# Patient Record
Sex: FEMALE | Race: WHITE | NOT HISPANIC OR LATINO | Employment: FULL TIME | URBAN - METROPOLITAN AREA
[De-identification: names, ages, dates, MRNs, and addresses within clinical notes are randomized per-mention and may not be internally consistent; named-entity substitution may affect disease eponyms.]

---

## 2018-06-14 ENCOUNTER — OFFICE VISIT (OUTPATIENT)
Dept: URGENT CARE | Facility: CLINIC | Age: 33
End: 2018-06-14
Payer: COMMERCIAL

## 2018-06-14 VITALS
OXYGEN SATURATION: 99 % | SYSTOLIC BLOOD PRESSURE: 116 MMHG | HEART RATE: 84 BPM | DIASTOLIC BLOOD PRESSURE: 78 MMHG | HEIGHT: 71 IN | BODY MASS INDEX: 21 KG/M2 | TEMPERATURE: 97.8 F | WEIGHT: 150 LBS | RESPIRATION RATE: 18 BRPM

## 2018-06-14 DIAGNOSIS — B96.89 BACTERIAL VAGINITIS: Primary | ICD-10-CM

## 2018-06-14 DIAGNOSIS — N76.0 BACTERIAL VAGINITIS: Primary | ICD-10-CM

## 2018-06-14 PROCEDURE — 99283 EMERGENCY DEPT VISIT LOW MDM: CPT | Performed by: PHYSICIAN ASSISTANT

## 2018-06-14 PROCEDURE — G0382 LEV 3 HOSP TYPE B ED VISIT: HCPCS | Performed by: PHYSICIAN ASSISTANT

## 2018-06-14 RX ORDER — METRONIDAZOLE 500 MG/1
500 TABLET ORAL EVERY 12 HOURS SCHEDULED
Qty: 14 TABLET | Refills: 0 | Status: SHIPPED | OUTPATIENT
Start: 2018-06-14 | End: 2018-06-21

## 2018-06-15 NOTE — PROGRESS NOTES
[facility] [facility]        NAME: Sergei Andres is a 35 y o  female  : 1985    MRN: [id number]  DATE: Jennifer 15, 2018  TIME: 1:49 PM    Assessment and Plan   Bacterial vaginitis [N76 0, B96 89]  1  Bacterial vaginitis  metroNIDAZOLE (FLAGYL) 500 mg tablet     Patient Instructions     Take antibiotic as prescribed  Follow up with PCP in 3-5 days  Proceed to  ER if symptoms worsen  Chief Complaint     Chief Complaint   Patient presents with    Vaginitis     Itching and burning  White thin discharge with fishy odor  Onset yesterday morning  History of Present Illness       32yo F p/w vaginal itching and burning x 2 days  Patient endorses thin, white discharge that has a strong fishy odor  Patient denies new sexual partners and states she has been recently tested for STIs  Review of Systems   Review of Systems   Constitutional: Negative for activity change, appetite change, chills, diaphoresis, fatigue and fever  Respiratory: Negative for apnea, cough, choking, chest tightness, shortness of breath, wheezing and stridor  Cardiovascular: Negative for chest pain, palpitations and leg swelling  Genitourinary: Positive for vaginal discharge and vaginal pain  Negative for decreased urine volume, difficulty urinating, dyspareunia, dysuria, enuresis, flank pain, frequency, genital sores, hematuria, menstrual problem, pelvic pain, urgency and vaginal bleeding  Current Medications       Current Outpatient Prescriptions:     metroNIDAZOLE (FLAGYL) 500 mg tablet, Take 1 tablet (500 mg total) by mouth every 12 (twelve) hours for 7 days, Disp: 14 tablet, Rfl: 0    Current Allergies     Allergies as of 2018    (No Known Allergies)            The following portions of the patient's history were reviewed and updated as appropriate: allergies, current medications, past family history, past medical history, past social history, past surgical history and problem list      History reviewed   No pertinent past medical history  Past Surgical History:   Procedure Laterality Date    AUGMENTATION BREAST         No family history on file  Medications have been verified  Objective   /78   Pulse 84   Temp 97 8 °F (36 6 °C)   Resp 18   Ht 5' 11" (1 803 m)   Wt 68 kg (150 lb)   LMP 05/01/2018   SpO2 99%   BMI 20 92 kg/m²        Physical Exam     Physical Exam   Constitutional: She appears well-developed and well-nourished  Cardiovascular: Normal rate, regular rhythm, normal heart sounds and intact distal pulses  Exam reveals no gallop and no friction rub  No murmur heard  Pulmonary/Chest: Effort normal  No respiratory distress  She has no wheezes  She has no rales  Abdominal: Soft  Bowel sounds are normal  She exhibits no distension  There is no tenderness  There is no rebound and no guarding

## 2022-04-12 ENCOUNTER — APPOINTMENT (EMERGENCY)
Dept: CT IMAGING | Facility: HOSPITAL | Age: 37
End: 2022-04-12
Payer: COMMERCIAL

## 2022-04-12 ENCOUNTER — HOSPITAL ENCOUNTER (EMERGENCY)
Facility: HOSPITAL | Age: 37
Discharge: HOME | End: 2022-04-12
Attending: EMERGENCY MEDICINE | Admitting: EMERGENCY MEDICINE
Payer: COMMERCIAL

## 2022-04-12 VITALS
HEART RATE: 62 BPM | WEIGHT: 153 LBS | HEIGHT: 71 IN | BODY MASS INDEX: 21.42 KG/M2 | SYSTOLIC BLOOD PRESSURE: 132 MMHG | RESPIRATION RATE: 16 BRPM | DIASTOLIC BLOOD PRESSURE: 80 MMHG | OXYGEN SATURATION: 98 % | TEMPERATURE: 98 F

## 2022-04-12 DIAGNOSIS — M54.50 LOW BACK PAIN: Primary | ICD-10-CM

## 2022-04-12 LAB
ANION GAP SERPL CALCULATED.3IONS-SCNC: 10 MMOL/L (ref 4–13)
BASOPHILS # BLD AUTO: 0.06 THOUSANDS/ΜL (ref 0–0.1)
BASOPHILS NFR BLD AUTO: 1 % (ref 0–1)
BILIRUB UR QL STRIP: NEGATIVE
BUN SERPL-MCNC: 10 MG/DL (ref 5–25)
CALCIUM SERPL-MCNC: 8.5 MG/DL (ref 8.3–10.1)
CHLORIDE SERPL-SCNC: 104 MMOL/L (ref 100–108)
CLARITY UR: CLEAR
CO2 SERPL-SCNC: 24 MMOL/L (ref 21–32)
COLOR UR: NORMAL
CREAT SERPL-MCNC: 1 MG/DL (ref 0.6–1.3)
EOSINOPHIL # BLD AUTO: 0.05 THOUSAND/ΜL (ref 0–0.61)
EOSINOPHIL NFR BLD AUTO: 1 % (ref 0–6)
ERYTHROCYTE [DISTWIDTH] IN BLOOD BY AUTOMATED COUNT: 12.3 % (ref 11.6–15.1)
EXT PREG TEST URINE: NEGATIVE
EXT. CONTROL ED NAV: NEGATIVE
GFR SERPL CREATININE-BSD FRML MDRD: 72 ML/MIN/1.73SQ M
GLUCOSE SERPL-MCNC: 89 MG/DL (ref 65–140)
GLUCOSE UR STRIP-MCNC: NEGATIVE MG/DL
HCT VFR BLD AUTO: 43.2 % (ref 34.8–46.1)
HGB BLD-MCNC: 14.6 G/DL (ref 11.5–15.4)
HGB UR QL STRIP.AUTO: NEGATIVE
IMM GRANULOCYTES # BLD AUTO: 0.01 THOUSAND/UL (ref 0–0.2)
IMM GRANULOCYTES NFR BLD AUTO: 0 % (ref 0–2)
KETONES UR STRIP-MCNC: NEGATIVE MG/DL
LEUKOCYTE ESTERASE UR QL STRIP: NEGATIVE
LYMPHOCYTES # BLD AUTO: 1.24 THOUSANDS/ΜL (ref 0.6–4.47)
LYMPHOCYTES NFR BLD AUTO: 23 % (ref 14–44)
MCH RBC QN AUTO: 32.3 PG (ref 26.8–34.3)
MCHC RBC AUTO-ENTMCNC: 33.8 G/DL (ref 31.4–37.4)
MCV RBC AUTO: 96 FL (ref 82–98)
MONOCYTES # BLD AUTO: 0.44 THOUSAND/ΜL (ref 0.17–1.22)
MONOCYTES NFR BLD AUTO: 8 % (ref 4–12)
NEUTROPHILS # BLD AUTO: 3.49 THOUSANDS/ΜL (ref 1.85–7.62)
NEUTS SEG NFR BLD AUTO: 67 % (ref 43–75)
NITRITE UR QL STRIP: NEGATIVE
NRBC BLD AUTO-RTO: 0 /100 WBCS
PH UR STRIP.AUTO: 8 [PH]
PLATELET # BLD AUTO: 189 THOUSANDS/UL (ref 149–390)
PMV BLD AUTO: 11.4 FL (ref 8.9–12.7)
POTASSIUM SERPL-SCNC: 3.5 MMOL/L (ref 3.5–5.3)
PROT UR STRIP-MCNC: NEGATIVE MG/DL
RBC # BLD AUTO: 4.52 MILLION/UL (ref 3.81–5.12)
SODIUM SERPL-SCNC: 138 MMOL/L (ref 136–145)
SP GR UR STRIP.AUTO: 1.01 (ref 1–1.03)
UROBILINOGEN UR QL STRIP.AUTO: 0.2 E.U./DL
WBC # BLD AUTO: 5.29 THOUSAND/UL (ref 4.31–10.16)

## 2022-04-12 PROCEDURE — 81025 URINE PREGNANCY TEST: CPT | Performed by: EMERGENCY MEDICINE

## 2022-04-12 PROCEDURE — 99285 EMERGENCY DEPT VISIT HI MDM: CPT | Performed by: EMERGENCY MEDICINE

## 2022-04-12 PROCEDURE — 96375 TX/PRO/DX INJ NEW DRUG ADDON: CPT

## 2022-04-12 PROCEDURE — 96376 TX/PRO/DX INJ SAME DRUG ADON: CPT

## 2022-04-12 PROCEDURE — 99284 EMERGENCY DEPT VISIT MOD MDM: CPT

## 2022-04-12 PROCEDURE — 96374 THER/PROPH/DIAG INJ IV PUSH: CPT

## 2022-04-12 PROCEDURE — 74177 CT ABD & PELVIS W/CONTRAST: CPT

## 2022-04-12 PROCEDURE — 85025 COMPLETE CBC W/AUTO DIFF WBC: CPT | Performed by: EMERGENCY MEDICINE

## 2022-04-12 PROCEDURE — 81003 URINALYSIS AUTO W/O SCOPE: CPT | Performed by: EMERGENCY MEDICINE

## 2022-04-12 PROCEDURE — 36415 COLL VENOUS BLD VENIPUNCTURE: CPT | Performed by: EMERGENCY MEDICINE

## 2022-04-12 PROCEDURE — 80048 BASIC METABOLIC PNL TOTAL CA: CPT | Performed by: EMERGENCY MEDICINE

## 2022-04-12 RX ORDER — MORPHINE SULFATE 4 MG/ML
4 INJECTION, SOLUTION INTRAMUSCULAR; INTRAVENOUS ONCE
Status: COMPLETED | OUTPATIENT
Start: 2022-04-12 | End: 2022-04-12

## 2022-04-12 RX ORDER — ONDANSETRON 2 MG/ML
4 INJECTION INTRAMUSCULAR; INTRAVENOUS ONCE
Status: COMPLETED | OUTPATIENT
Start: 2022-04-12 | End: 2022-04-12

## 2022-04-12 RX ORDER — DIAZEPAM 5 MG/1
5-10 TABLET ORAL EVERY 6 HOURS PRN
Qty: 8 TABLET | Refills: 0 | Status: SHIPPED | OUTPATIENT
Start: 2022-04-12 | End: 2022-04-22

## 2022-04-12 RX ORDER — PREDNISONE 10 MG/1
50 TABLET ORAL DAILY
Qty: 25 TABLET | Refills: 0 | Status: SHIPPED | OUTPATIENT
Start: 2022-04-12 | End: 2022-04-17

## 2022-04-12 RX ORDER — KETOROLAC TROMETHAMINE 30 MG/ML
15 INJECTION, SOLUTION INTRAMUSCULAR; INTRAVENOUS ONCE
Status: COMPLETED | OUTPATIENT
Start: 2022-04-12 | End: 2022-04-12

## 2022-04-12 RX ADMIN — MORPHINE SULFATE 4 MG: 4 INJECTION INTRAVENOUS at 12:41

## 2022-04-12 RX ADMIN — MORPHINE SULFATE 4 MG: 4 INJECTION INTRAVENOUS at 13:11

## 2022-04-12 RX ADMIN — KETOROLAC TROMETHAMINE 15 MG: 30 INJECTION, SOLUTION INTRAMUSCULAR at 12:41

## 2022-04-12 RX ADMIN — IOHEXOL 100 ML: 350 INJECTION, SOLUTION INTRAVENOUS at 13:25

## 2022-04-12 RX ADMIN — ONDANSETRON 4 MG: 2 INJECTION INTRAMUSCULAR; INTRAVENOUS at 12:41

## 2022-04-12 NOTE — ED NOTES
Patient discharged to home  Verbalized understanding of discharge instructions and need for follow up care  IV dc'd intact         Paulie Ramos RN  04/12/22 3412

## 2022-04-12 NOTE — ED PROVIDER NOTES
History  Chief Complaint   Patient presents with    Flank Pain     pt c/o left sided flank pain radiating to abdomen w/nausea for past couple days  pt seen at urgent care and instructed to come to ED per further evaluation  51-year-old female complains of stabbing left flank pain radiating to left lower quadrant abdomen, severely since 3:00 a m  Today  But ongoing for the past few days, dissimilar compared to chronic low back pain  Also dissimilar to menstrual cramping pain  Notes she has an IUD in recently at gyn, no cysts  She denies vomiting, notes she is nauseated  She denies constipation and diarrhea , stooling normally  No injury  No fever or chills  No history of nephrolithiasis      History provided by:  Patient  Flank Pain  Pain location:  L flank  Pain quality: stabbing    Pain radiates to:  LLQ  Pain severity:  Severe  Onset quality:  Sudden  Timing:  Constant  Progression:  Worsening  Chronicity:  New  Context: not trauma    Relieved by:  None tried  Worsened by:  Nothing  Ineffective treatments:  None tried  Associated symptoms: no chest pain, no fever and no shortness of breath        None       History reviewed  No pertinent past medical history  Past Surgical History:   Procedure Laterality Date    AUGMENTATION BREAST         History reviewed  No pertinent family history  I have reviewed and agree with the history as documented  E-Cigarette/Vaping    E-Cigarette Use Never User      E-Cigarette/Vaping Substances     Social History     Tobacco Use    Smoking status: Current Every Day Smoker     Packs/day: 1 00     Types: Cigarettes    Smokeless tobacco: Never Used   Vaping Use    Vaping Use: Never used   Substance Use Topics    Alcohol use: Yes     Alcohol/week: 8 0 standard drinks     Types: 2 Glasses of wine, 6 Cans of beer per week     Comment: daily    Drug use: Yes     Types: Marijuana       Review of Systems   Constitutional: Negative for fever     Respiratory: Negative for shortness of breath  Cardiovascular: Negative for chest pain  Genitourinary: Positive for flank pain  Physical Exam  Physical Exam  Vitals and nursing note reviewed  Constitutional:       General: She is in acute distress  Comments: Pleasant, uncomfortable-appearing, right lateral recumbent with left lower extremity flexed to chest   HENT:      Head: Normocephalic and atraumatic  Nose: Nose normal       Mouth/Throat:      Mouth: Mucous membranes are moist       Pharynx: Oropharynx is clear  Eyes:      Conjunctiva/sclera: Conjunctivae normal       Pupils: Pupils are equal, round, and reactive to light  Cardiovascular:      Rate and Rhythm: Normal rate and regular rhythm  Heart sounds: Normal heart sounds  Pulmonary:      Effort: Pulmonary effort is normal       Breath sounds: Normal breath sounds  Abdominal:      General: Bowel sounds are normal  There is no distension  Palpations: Abdomen is soft  Comments: Left mid/lower tenderness, no rebound or guarding, left CV tenderness, no overlying skin changes or swelling   Musculoskeletal:         General: Normal range of motion  Cervical back: Neck supple  Skin:     General: Skin is warm and dry  Neurological:      General: No focal deficit present  Mental Status: She is alert and oriented to person, place, and time  Cranial Nerves: No cranial nerve deficit  Coordination: Coordination normal    Psychiatric:         Behavior: Behavior normal          Thought Content:  Thought content normal          Judgment: Judgment normal          Vital Signs  ED Triage Vitals [04/12/22 1225]   Temperature Pulse Respirations Blood Pressure SpO2   98 2 °F (36 8 °C) 98 17 146/80 100 %      Temp Source Heart Rate Source Patient Position - Orthostatic VS BP Location FiO2 (%)   Temporal Monitor Sitting Right arm --      Pain Score       10 - Worst Possible Pain           Vitals:    04/12/22 1225 04/12/22 1400   BP: 146/80 134/80   Pulse: 98 57   Patient Position - Orthostatic VS: Sitting Lying         Visual Acuity      ED Medications  Medications   ketorolac (TORADOL) injection 15 mg (15 mg Intravenous Given 4/12/22 1241)   morphine (PF) 4 mg/mL injection 4 mg (4 mg Intravenous Given 4/12/22 1241)   ondansetron (ZOFRAN) injection 4 mg (4 mg Intravenous Given 4/12/22 1241)   morphine (PF) 4 mg/mL injection 4 mg (4 mg Intravenous Given 4/12/22 1311)   iohexol (OMNIPAQUE) 350 MG/ML injection (SINGLE-DOSE) 100 mL (100 mL Intravenous Given 4/12/22 1325)       Diagnostic Studies  Results Reviewed     Procedure Component Value Units Date/Time    Basic metabolic panel [[id number]] Collected: 04/12/22 1245    Lab Status: Final result Specimen: Blood from Arm, Left Updated: 04/12/22 1307     Sodium 138 mmol/L      Potassium 3 5 mmol/L      Chloride 104 mmol/L      CO2 24 mmol/L      ANION GAP 10 mmol/L      BUN 10 mg/dL      Creatinine 1 00 mg/dL      Glucose 89 mg/dL      Calcium 8 5 mg/dL      eGFR 72 ml/min/1 73sq m     Narrative:      [place] guidelines for Chronic Kidney Disease (CKD):     Stage 1 with normal or high GFR (GFR > 90 mL/min/1 73 square meters)    Stage 2 Mild CKD (GFR = 60-89 mL/min/1 73 square meters)    Stage 3A Moderate CKD (GFR = 45-59 mL/min/1 73 square meters)    Stage 3B Moderate CKD (GFR = 30-44 mL/min/1 73 square meters)    Stage 4 Severe CKD (GFR = 15-29 mL/min/1 73 square meters)    Stage 5 End Stage CKD (GFR <15 mL/min/1 73 square meters)  Note: GFR calculation is accurate only with a steady state creatinine    CBC and differential [[id number]] Collected: 04/12/22 1245    Lab Status: Final result Specimen: Blood from Arm, Left Updated: 04/12/22 1256     WBC 5 29 Thousand/uL      RBC 4 52 Million/uL      Hemoglobin 14 6 g/dL      Hematocrit 43 2 %      MCV 96 fL      MCH 32 3 pg      MCHC 33 8 g/dL      RDW 12 3 %      MPV 11 4 fL      Platelets 221 Thousands/uL      nRBC 0 /100 WBCs      Neutrophils Relative 67 %      Immat GRANS % 0 %      Lymphocytes Relative 23 %      Monocytes Relative 8 %      Eosinophils Relative 1 %      Basophils Relative 1 %      Neutrophils Absolute 3 49 Thousands/µL      Immature Grans Absolute 0 01 Thousand/uL      Lymphocytes Absolute 1 24 Thousands/µL      Monocytes Absolute 0 44 Thousand/µL      Eosinophils Absolute 0 05 Thousand/µL      Basophils Absolute 0 06 Thousands/µL     UA w Reflex to Microscopic w Reflex to Culture [[id number]] Collected: 04/12/22 1245    Lab Status: Final result Specimen: Urine, Clean Catch Updated: 04/12/22 1252     Color, UA Straw     Clarity, UA Clear     Specific Gravity, UA 1 015     pH, UA 8 0     Leukocytes, UA Negative     Nitrite, UA Negative     Protein, UA Negative mg/dl      Glucose, UA Negative mg/dl      Ketones, UA Negative mg/dl      Urobilinogen, UA 0 2 E U /dl      Bilirubin, UA Negative     Blood, UA Negative    POCT pregnancy, urine [[id number]]  (Normal) Resulted: 04/12/22 1239    Lab Status: Final result Updated: 04/12/22 1239     EXT PREG TEST UR (Ref: Negative) negative     Control negative                 CT abdomen pelvis w contrast   Final Result by Loraine Kam MD (04/12 1431)      No acute inflammatory process identified in the abdomen or pelvis  Workstation performed: [id number]                    Procedures  Procedures         ED Course  ED Course as of 04/12/22 1450   Tue Apr 12, 2022   1244 PREGNANCY TEST URINE: negative   1447 Markedly improved   Discussed results and provided copy, agreeable with close outpatient f/u, will return immediately if worse or any new symptoms                                             MDM    Disposition  Final diagnoses:   Low back pain     Time reflects when diagnosis was documented in both MDM as applicable and the Disposition within this note     Time User Action Codes Description Comment    4/12/2022  2:46 PM Baltazar Okeefe Add [M54 50] Low back pain       ED Disposition     ED Disposition Condition Date/Time Comment    Discharge Stable Tue Apr 12, 2022  2:47 PM Shonda Prince discharge to home/self care  Follow-up Information     Follow up With Specialties Details Why Contact Info    Clary Bauer MD [facility] Medicine Schedule an appointment as soon as possible for a visit in 1 week  [address]  [phone]            Patient's Medications   Discharge Prescriptions    DIAZEPAM (VALIUM) 5 MG TABLET    Take 1-2 tablets (5-10 mg total) by mouth every 6 (six) hours as needed for muscle spasms for up to 10 days       Start Date: 4/12/2022 End Date: 4/22/2022       Order Dose: 5-10 mg       Quantity: 8 tablet    Refills: 0    PREDNISONE 10 MG TABLET    Take 5 tablets (50 mg total) by mouth daily for 5 days       Start Date: 4/12/2022 End Date: 4/17/2022       Order Dose: 50 mg       Quantity: 25 tablet    Refills: 0       No discharge procedures on file      PDMP Review     None          ED Provider  Electronically Signed by           Cecille Hicks DO  04/12/22 0834

## 2023-07-30 ENCOUNTER — APPOINTMENT (EMERGENCY)
Dept: RADIOLOGY | Facility: HOSPITAL | Age: 38
End: 2023-07-30
Payer: COMMERCIAL

## 2023-07-30 ENCOUNTER — HOSPITAL ENCOUNTER (EMERGENCY)
Facility: HOSPITAL | Age: 38
Discharge: HOME | End: 2023-07-30
Attending: EMERGENCY MEDICINE
Payer: COMMERCIAL

## 2023-07-30 VITALS
DIASTOLIC BLOOD PRESSURE: 84 MMHG | HEART RATE: 95 BPM | SYSTOLIC BLOOD PRESSURE: 132 MMHG | HEIGHT: 71 IN | TEMPERATURE: 97.1 F | OXYGEN SATURATION: 100 % | BODY MASS INDEX: 21.42 KG/M2 | WEIGHT: 153 LBS | RESPIRATION RATE: 16 BRPM

## 2023-07-30 DIAGNOSIS — L03.116 CELLULITIS OF LEFT LOWER EXTREMITY: Primary | ICD-10-CM

## 2023-07-30 LAB
ALBUMIN SERPL BCP-MCNC: 3.9 G/DL (ref 3.5–5)
ALP SERPL-CCNC: 60 U/L (ref 34–104)
ALT SERPL W P-5'-P-CCNC: 13 U/L (ref 7–52)
ANION GAP SERPL CALCULATED.3IONS-SCNC: 6 MMOL/L
APTT PPP: 31 SECONDS (ref 23–37)
AST SERPL W P-5'-P-CCNC: 15 U/L (ref 13–39)
BASOPHILS # BLD AUTO: 0.06 THOUSANDS/ÂΜL (ref 0–0.1)
BASOPHILS NFR BLD AUTO: 1 % (ref 0–1)
BILIRUB SERPL-MCNC: 0.46 MG/DL (ref 0.2–1)
BUN SERPL-MCNC: 11 MG/DL (ref 5–25)
CALCIUM SERPL-MCNC: 8.7 MG/DL (ref 8.4–10.2)
CHLORIDE SERPL-SCNC: 107 MMOL/L (ref 96–108)
CK SERPL-CCNC: 109 U/L (ref 26–192)
CO2 SERPL-SCNC: 25 MMOL/L (ref 21–32)
CREAT SERPL-MCNC: 0.78 MG/DL (ref 0.6–1.3)
D DIMER PPP FEU-MCNC: 0.33 UG/ML FEU
EOSINOPHIL # BLD AUTO: 0.13 THOUSAND/ÂΜL (ref 0–0.61)
EOSINOPHIL NFR BLD AUTO: 2 % (ref 0–6)
ERYTHROCYTE [DISTWIDTH] IN BLOOD BY AUTOMATED COUNT: 12.1 % (ref 11.6–15.1)
GFR SERPL CREATININE-BSD FRML MDRD: 96 ML/MIN/1.73SQ M
GLUCOSE SERPL-MCNC: 88 MG/DL (ref 65–140)
HCT VFR BLD AUTO: 40.9 % (ref 34.8–46.1)
HGB BLD-MCNC: 13.7 G/DL (ref 11.5–15.4)
IMM GRANULOCYTES # BLD AUTO: 0.02 THOUSAND/UL (ref 0–0.2)
IMM GRANULOCYTES NFR BLD AUTO: 0 % (ref 0–2)
INR PPP: 1.04 (ref 0.84–1.19)
LACTATE SERPL-SCNC: 1.5 MMOL/L (ref 0.5–2)
LYMPHOCYTES # BLD AUTO: 1.61 THOUSANDS/ÂΜL (ref 0.6–4.47)
LYMPHOCYTES NFR BLD AUTO: 20 % (ref 14–44)
MCH RBC QN AUTO: 31.6 PG (ref 26.8–34.3)
MCHC RBC AUTO-ENTMCNC: 33.5 G/DL (ref 31.4–37.4)
MCV RBC AUTO: 94 FL (ref 82–98)
MONOCYTES # BLD AUTO: 0.74 THOUSAND/ÂΜL (ref 0.17–1.22)
MONOCYTES NFR BLD AUTO: 9 % (ref 4–12)
NEUTROPHILS # BLD AUTO: 5.4 THOUSANDS/ÂΜL (ref 1.85–7.62)
NEUTS SEG NFR BLD AUTO: 68 % (ref 43–75)
NRBC BLD AUTO-RTO: 0 /100 WBCS
PLATELET # BLD AUTO: 174 THOUSANDS/UL (ref 149–390)
PMV BLD AUTO: 11.2 FL (ref 8.9–12.7)
POTASSIUM SERPL-SCNC: 3.3 MMOL/L (ref 3.5–5.3)
PROT SERPL-MCNC: 6.5 G/DL (ref 6.4–8.4)
PROTHROMBIN TIME: 13.7 SECONDS (ref 11.6–14.5)
RBC # BLD AUTO: 4.34 MILLION/UL (ref 3.81–5.12)
SODIUM SERPL-SCNC: 138 MMOL/L (ref 135–147)
WBC # BLD AUTO: 7.96 THOUSAND/UL (ref 4.31–10.16)

## 2023-07-30 PROCEDURE — 96365 THER/PROPH/DIAG IV INF INIT: CPT

## 2023-07-30 PROCEDURE — 85025 COMPLETE CBC W/AUTO DIFF WBC: CPT | Performed by: EMERGENCY MEDICINE

## 2023-07-30 PROCEDURE — 80053 COMPREHEN METABOLIC PANEL: CPT | Performed by: EMERGENCY MEDICINE

## 2023-07-30 PROCEDURE — 36415 COLL VENOUS BLD VENIPUNCTURE: CPT | Performed by: EMERGENCY MEDICINE

## 2023-07-30 PROCEDURE — 85730 THROMBOPLASTIN TIME PARTIAL: CPT | Performed by: EMERGENCY MEDICINE

## 2023-07-30 PROCEDURE — 99283 EMERGENCY DEPT VISIT LOW MDM: CPT

## 2023-07-30 PROCEDURE — 73564 X-RAY EXAM KNEE 4 OR MORE: CPT

## 2023-07-30 PROCEDURE — 86618 LYME DISEASE ANTIBODY: CPT | Performed by: EMERGENCY MEDICINE

## 2023-07-30 PROCEDURE — 83605 ASSAY OF LACTIC ACID: CPT | Performed by: EMERGENCY MEDICINE

## 2023-07-30 PROCEDURE — 82550 ASSAY OF CK (CPK): CPT | Performed by: EMERGENCY MEDICINE

## 2023-07-30 PROCEDURE — 99284 EMERGENCY DEPT VISIT MOD MDM: CPT | Performed by: EMERGENCY MEDICINE

## 2023-07-30 PROCEDURE — 96375 TX/PRO/DX INJ NEW DRUG ADDON: CPT

## 2023-07-30 PROCEDURE — 87040 BLOOD CULTURE FOR BACTERIA: CPT | Performed by: EMERGENCY MEDICINE

## 2023-07-30 PROCEDURE — 85610 PROTHROMBIN TIME: CPT | Performed by: EMERGENCY MEDICINE

## 2023-07-30 PROCEDURE — 85379 FIBRIN DEGRADATION QUANT: CPT | Performed by: EMERGENCY MEDICINE

## 2023-07-30 RX ORDER — KETOROLAC TROMETHAMINE 30 MG/ML
15 INJECTION, SOLUTION INTRAMUSCULAR; INTRAVENOUS ONCE
Status: COMPLETED | OUTPATIENT
Start: 2023-07-30 | End: 2023-07-30

## 2023-07-30 RX ORDER — VANCOMYCIN HYDROCHLORIDE 1 G/200ML
15 INJECTION, SOLUTION INTRAVENOUS ONCE
Status: COMPLETED | OUTPATIENT
Start: 2023-07-30 | End: 2023-07-30

## 2023-07-30 RX ORDER — CEFTRIAXONE 1 G/50ML
1000 INJECTION, SOLUTION INTRAVENOUS ONCE
Status: COMPLETED | OUTPATIENT
Start: 2023-07-30 | End: 2023-07-30

## 2023-07-30 RX ORDER — CEPHALEXIN 500 MG/1
500 CAPSULE ORAL EVERY 6 HOURS SCHEDULED
Qty: 40 CAPSULE | Refills: 0 | Status: SHIPPED | OUTPATIENT
Start: 2023-07-30 | End: 2023-08-09

## 2023-07-30 RX ORDER — SULFAMETHOXAZOLE AND TRIMETHOPRIM 800; 160 MG/1; MG/1
1 TABLET ORAL 2 TIMES DAILY
Qty: 20 TABLET | Refills: 0 | Status: SHIPPED | OUTPATIENT
Start: 2023-07-30 | End: 2023-08-09

## 2023-07-30 RX ADMIN — KETOROLAC TROMETHAMINE 15 MG: 30 INJECTION, SOLUTION INTRAMUSCULAR; INTRAVENOUS at 18:40

## 2023-07-30 RX ADMIN — CEFTRIAXONE 1000 MG: 1 INJECTION, SOLUTION INTRAVENOUS at 18:28

## 2023-07-30 RX ADMIN — VANCOMYCIN HYDROCHLORIDE 1000 MG: 1 INJECTION, SOLUTION INTRAVENOUS at 18:35

## 2023-07-30 NOTE — ED PROVIDER NOTES
History  Chief Complaint   Patient presents with   • Knee Swelling     To the LEFT knee. Swollen to the knee area and to the ankle     Patient complains of swelling to the knee since last week. Getting progressively worse. No fevers or chills. No nausea vomiting or diarrhea. Redness and swelling is now going down to the ankle. No history of same. Not diabetic. History provided by:  Patient   used: No    Leg Pain  Location:  Knee  Time since incident:  1 week (1)  Injury: no    Knee location:  L knee  Pain details:     Quality:  Aching    Radiates to:  Does not radiate    Severity:  Mild    Onset quality:  Gradual    Duration:  1 week    Timing:  Constant    Progression:  Worsening  Chronicity:  New  Prior injury to area:  No  Relieved by:  Nothing  Worsened by:  Nothing  Ineffective treatments:  None tried  Associated symptoms: swelling    Associated symptoms: no back pain, no decreased ROM, no fever, no muscle weakness and no neck pain        Prior to Admission Medications   Prescriptions Last Dose Informant Patient Reported? Taking? NON FORMULARY   Yes No   Sig: Inhale [facility]      Facility-Administered Medications: None       History reviewed. No pertinent past medical history. Past Surgical History:   Procedure Laterality Date   • AUGMENTATION BREAST         History reviewed. No pertinent family history. I have reviewed and agree with the history as documented. E-Cigarette/Vaping   • E-Cigarette Use Never User      E-Cigarette/Vaping Substances     Social History     Tobacco Use   • Smoking status: Every Day     Packs/day: 1.00     Types: Cigarettes   • Smokeless tobacco: Never   Vaping Use   • Vaping Use: Never used   Substance Use Topics   • Alcohol use:  Yes     Alcohol/week: 8.0 standard drinks of alcohol     Types: 2 Glasses of wine, 6 Cans of beer per week     Comment: daily   • Drug use: Yes     Types: Marijuana       Review of Systems   Constitutional: Negative for chills and fever. HENT: Negative for ear pain, hearing loss, sore throat, trouble swallowing and voice change. Eyes: Negative for pain and discharge. Respiratory: Negative for cough, shortness of breath and wheezing. Cardiovascular: Negative for chest pain and palpitations. Gastrointestinal: Negative for abdominal pain, blood in stool, constipation, diarrhea, nausea and vomiting. Genitourinary: Negative for dysuria, flank pain, frequency and hematuria. Musculoskeletal: Negative for back pain, joint swelling, neck pain and neck stiffness. Skin: Negative for rash and wound. Neurological: Negative for dizziness, seizures, syncope, facial asymmetry and headaches. Psychiatric/Behavioral: Negative for hallucinations, self-injury and suicidal ideas. All other systems reviewed and are negative. Physical Exam  Physical Exam  Constitutional:       General: She is not in acute distress. Appearance: Normal appearance. She is not ill-appearing. HENT:      Head: Normocephalic and atraumatic. Right Ear: External ear normal.      Left Ear: External ear normal.      Nose: Nose normal.      Mouth/Throat:      Mouth: Mucous membranes are moist.   Eyes:      Extraocular Movements: Extraocular movements intact. Pupils: Pupils are equal, round, and reactive to light. Cardiovascular:      Rate and Rhythm: Normal rate and regular rhythm. Pulmonary:      Effort: Pulmonary effort is normal. No respiratory distress. Breath sounds: Normal breath sounds. Abdominal:      General: Abdomen is flat. Bowel sounds are normal. There is no distension. Palpations: Abdomen is soft. Tenderness: There is no abdominal tenderness. Musculoskeletal:         General: Swelling and tenderness present. Cervical back: Normal range of motion and neck supple. Comments: Left knee with diffuse erythema and warmth. The area of erythema and warmth extends down to the lower leg. No fluctuance. No induration. Full range of motion of the knee without any pain. Skin:     General: Skin is warm and dry. Capillary Refill: Capillary refill takes less than 2 seconds. Neurological:      General: No focal deficit present. Mental Status: She is alert and oriented to person, place, and time. Psychiatric:         Mood and Affect: Mood normal.         Behavior: Behavior normal.         Vital Signs  ED Triage Vitals [07/30/23 1746]   Temperature Pulse Respirations Blood Pressure SpO2   (!) 97.1 °F (36.2 °C) 95 16 132/84 100 %      Temp src Heart Rate Source Patient Position - Orthostatic VS BP Location FiO2 (%)   -- -- -- Right arm --      Pain Score       3           Vitals:    07/30/23 1746   BP: 132/84   Pulse: 95         Visual Acuity      ED Medications  Medications   vancomycin (VANCOCIN) IVPB (premix in dextrose) 1,000 mg 200 mL (1,000 mg Intravenous New Bag 7/30/23 1835)   cefTRIAXone (ROCEPHIN) IVPB (premix in dextrose) 1,000 mg 50 mL (0 mg Intravenous Stopped 7/30/23 1835)   ketorolac (TORADOL) injection 15 mg (15 mg Intravenous Given 7/30/23 1840)       Diagnostic Studies  Results Reviewed     Procedure Component Value Units Date/Time    Lactic acid, plasma (w/reflex if result > 2.0) [[id number]]  (Normal) Collected: 07/30/23 1819    Lab Status: Final result Specimen: Blood from Arm, Left Updated: 07/30/23 1856     LACTIC ACID 1.5 mmol/L     Narrative:      Result may be elevated if tourniquet was used during collection.     CK [[id number]]  (Normal) Collected: 07/30/23 1819    Lab Status: Final result Specimen: Blood from Arm, Left Updated: 07/30/23 1856     Total  U/L     Comprehensive metabolic panel [[id number]]  (Abnormal) Collected: 07/30/23 1819    Lab Status: Final result Specimen: Blood from Arm, Left Updated: 07/30/23 1856     Sodium 138 mmol/L      Potassium 3.3 mmol/L      Chloride 107 mmol/L      CO2 25 mmol/L      ANION GAP 6 mmol/L      BUN 11 mg/dL Creatinine 0.78 mg/dL      Glucose 88 mg/dL      Calcium 8.7 mg/dL      AST 15 U/L      ALT 13 U/L      Alkaline Phosphatase 60 U/L      Total Protein 6.5 g/dL      Albumin 3.9 g/dL      Total Bilirubin 0.46 mg/dL      eGFR 96 ml/min/1.73sq m     Narrative:      National Kid[employer] Disease Foundation guidelines for Chronic Kidney Disease (CKD):   •  Stage 1 with normal or high GFR (GFR > 90 mL/min/1.73 square meters)  •  Stage 2 Mild CKD (GFR = 60-89 mL/min/1.73 square meters)  •  Stage 3A Moderate CKD (GFR = 45-59 mL/min/1.73 square meters)  •  Stage 3B Moderate CKD (GFR = 30-44 mL/min/1.73 square meters)  •  Stage 4 Severe CKD (GFR = 15-29 mL/min/1.73 square meters)  •  Stage 5 End Stage CKD (GFR <15 mL/min/1.73 square meters)  Note: GFR calculation is accurate only with a steady state creatinine    D-Dimer [[id number]]  (Normal) Collected: 07/30/23 1819    Lab Status: Final result Specimen: Blood from Arm, Left Updated: 07/30/23 1855     D-Dimer, Quant 0.33 ug/ml FEU     Protime-INR [[id number]]  (Normal) Collected: 07/30/23 1819    Lab Status: Final result Specimen: Blood from Arm, Left Updated: 07/30/23 1853     Protime 13.7 seconds      INR 1.04    APTT [[id number]]  (Normal) Collected: 07/30/23 1819    Lab Status: Final result Specimen: Blood from Arm, Left Updated: 07/30/23 1853     PTT 31 seconds     CBC and differential [[id number]] Collected: 07/30/23 1819    Lab Status: Final result Specimen: Blood from Arm, Left Updated: 07/30/23 1834     WBC 7.96 Thousand/uL      RBC 4.34 Million/uL      Hemoglobin 13.7 g/dL      Hematocrit 40.9 %      MCV 94 fL      MCH 31.6 pg      MCHC 33.5 g/dL      RDW 12.1 %      MPV 11.2 fL      Platelets 239 Thousands/uL      nRBC 0 /100 WBCs      Neutrophils Relative 68 %      Immat GRANS % 0 %      Lymphocytes Relative 20 %      Monocytes Relative 9 %      Eosinophils Relative 2 %      Basophils Relative 1 %      Neutrophils Absolute 5.40 Thousands/µL      Immature Grans Absolute 0.02 Thousand/uL      Lymphocytes Absolute 1.61 Thousands/µL      Monocytes Absolute 0.74 Thousand/µL      Eosinophils Absolute 0.13 Thousand/µL      Basophils Absolute 0.06 Thousands/µL     Blood culture #1 [[id number]] Collected: 07/30/23 1826    Lab Status: In process Specimen: Blood from Arm, Right Updated: 07/30/23 1833    Blood culture #2 [[id number]] Collected: 07/30/23 1819    Lab Status: In process Specimen: Blood from Arm, Left Updated: 07/30/23 1833    Lyme Total AB W Reflex to IGM/IGG [[id number]] Collected: 07/30/23 1819    Lab Status: In process Specimen: Blood from Arm, Left Updated: 07/30/23 1831                 XR knee 4+ vw left injury   ED Interpretation by Margret Otero MD (07/30 1815)   No fracture or dislocation. Procedures  Procedures         ED Course  ED Course as of 07/30/23 1907   Matt Correa Jul 30, 2023   2732 D-Dimer, Quant: 0.33  Discussed with patient about her lab and CAT scan results. Less likely DVT as D-dimer is negative. Patient does have a history of cellulitis of the left leg. States this is similar to it. We will start the patient on antibiotics. SBIRT 22yo+    Flowsheet Row Most Recent Value   Initial Alcohol Screen: US AUDIT-C     1. How often do you have a drink containing alcohol? 0 Filed at: 07/30/2023 1748   2. How many drinks containing alcohol do you have on a typical day you are drinking? 0 Filed at: 07/30/2023 1748   3b. FEMALE Any Age, or MALE 65+: How often do you have 4 or more drinks on one occassion? 0 Filed at: 07/30/2023 1748   Audit-C Score 0 Filed at: 07/30/2023 8012   MATT: How many times in the past year have you. .. Used an illegal drug or used a prescription medication for non-medical reasons? Never Filed at: 07/30/2023 6003                    Medical Decision Making  Amount and/or Complexity of Data Reviewed  Labs: ordered. Decision-making details documented in ED Course.   Radiology: ordered and independent interpretation performed. Decision-making details documented in ED Course. Discussion of management or test interpretation with external provider(s): Monet diagnosis includes but not limited to cellulitis, Lyme disease, bursitis, abscess. Risk  Prescription drug management. Disposition  Final diagnoses:   Cellulitis of left lower extremity     Time reflects when diagnosis was documented in both MDM as applicable and the Disposition within this note     Time User Action Codes Description Comment    7/30/2023  6:16 PM Bernabe Polk Add [W36.417] Cellulitis of left lower extremity       ED Disposition     ED Disposition   Discharge    Condition   Stable    Date/Time   Sun Jul 30, 2023  7:06 PM    Comment   Bharath Drake discharge to home/self care. Follow-up Information     Follow up With Specialties Details Why Contact Info    Narayan Carmen MD Family Medicine Call in 1 day  [address] [phone] [facility]  [phone]            Patient's Medications   Discharge Prescriptions    CEPHALEXIN (KEFLEX) 500 MG CAPSULE    Take 1 capsule (500 mg total) by mouth every 6 (six) hours for 10 days       Start Date: 7/30/2023 End Date: 8/9/2023       Order Dose: 500 mg       Quantity: 40 capsule    Refills: 0    SULFAMETHOXAZOLE-TRIMETHOPRIM (BACTRIM DS) 800-160 MG PER TABLET    Take 1 tablet by mouth 2 (two) times a day for 10 days smx-tmp DS (BACTRIM) 800-160 mg tabs (1tab q12 D10)       Start Date: 7/30/2023 End Date: 8/9/2023       Order Dose: 1 tablet       Quantity: 20 tablet    Refills: 0       No discharge procedures on file.     PDMP Review     None          ED Provider  Electronically Signed by           Bernabe Polk MD  07/30/23 4024

## 2023-07-30 NOTE — Clinical Note
Julio Cesar Phelps was seen and treated in our emergency department on 7/30/2023. Diagnosis:     Luca Castillo  may return to work on return date. She may return on this date: 08/02/2023         If you have any questions or concerns, please don't hesitate to call.       Danis Flores MD    ______________________________           _______________          _______________  Hospital Representative                              Date                                Time

## 2023-07-31 LAB — B BURGDOR IGG+IGM SER QL IA: NEGATIVE

## 2023-08-04 LAB
BACTERIA BLD CULT: NORMAL
BACTERIA BLD CULT: NORMAL

## 2023-09-21 ENCOUNTER — HOSPITAL ENCOUNTER (EMERGENCY)
Facility: HOSPITAL | Age: 38
Discharge: HOME | End: 2023-09-21
Attending: EMERGENCY MEDICINE | Admitting: EMERGENCY MEDICINE
Payer: COMMERCIAL

## 2023-09-21 VITALS
RESPIRATION RATE: 18 BRPM | DIASTOLIC BLOOD PRESSURE: 83 MMHG | HEART RATE: 97 BPM | BODY MASS INDEX: 20.72 KG/M2 | OXYGEN SATURATION: 100 % | SYSTOLIC BLOOD PRESSURE: 118 MMHG | HEIGHT: 71 IN | TEMPERATURE: 98.6 F | WEIGHT: 148 LBS

## 2023-09-21 DIAGNOSIS — L03.90 CELLULITIS: Primary | ICD-10-CM

## 2023-09-21 PROCEDURE — 99282 EMERGENCY DEPT VISIT SF MDM: CPT

## 2023-09-21 PROCEDURE — 99284 EMERGENCY DEPT VISIT MOD MDM: CPT | Performed by: EMERGENCY MEDICINE

## 2023-09-21 RX ORDER — CHLORHEXIDINE GLUCONATE 4 G/100ML
1 SOLUTION TOPICAL DAILY
Qty: 118 ML | Refills: 1 | Status: SHIPPED | OUTPATIENT
Start: 2023-09-21 | End: 2023-09-28

## 2023-09-21 RX ORDER — DOXYCYCLINE HYCLATE 100 MG/1
100 CAPSULE ORAL 2 TIMES DAILY
Qty: 14 CAPSULE | Refills: 0 | Status: SHIPPED | OUTPATIENT
Start: 2023-09-21 | End: 2023-09-28

## 2023-09-21 RX ORDER — CHLORHEXIDINE GLUCONATE ORAL RINSE 1.2 MG/ML
15 SOLUTION DENTAL 2 TIMES DAILY
Qty: 120 ML | Refills: 0 | Status: SHIPPED | OUTPATIENT
Start: 2023-09-21

## 2023-09-21 NOTE — DISCHARGE INSTRUCTIONS
Use medications and showering products and nasal ointment as prescribed. Return with any worsening, particularly if you believe you have an abscess that needs to be drained. Follow-up with dermatology as discussed.

## 2023-09-21 NOTE — ED PROVIDER NOTES
History  Chief Complaint   Patient presents with   • Leg Pain     Pt c/o pain and redness below knee with body aches and fatigue starting yesterday morning. Recent dx cellulitis to left knee 6 wks ago finishing abx 3-4 wks ago. Pt also mentioned having a couple painful ingrown hairs in pubic area and underneath armpits. Denies fevers     Patient is a 55-year-old female to the emergency room complaining of 2 spots of erythema on her left lower leg. Patient points to 2 areas just below her knee on the anterior lower leg. It also reports that she has a "lump" in her right axilla. Patient is concerned as she has had previous episodes of cellulitis. Reports that these have sometimes been challenging to resolve. She denies any fevers or chills but did report to nursing staff that she had some body aches and fatigue. No nausea or vomiting. Rash  Location:  Leg and shoulder/arm  Shoulder/arm rash location:  R axilla  Leg rash location:  L lower leg  Quality: painful, redness and swelling    Quality: not blistering, not bruising, not burning and not itchy    Associated symptoms: fatigue and myalgias    Associated symptoms: no fever, no nausea and not vomiting        Prior to Admission Medications   Prescriptions Last Dose Informant Patient Reported? Taking? NON FORMULARY   Yes No   Sig: In[facility]      Facility-Administered Medications: None       History reviewed. No pertinent past medical history. Past Surgical History:   Procedure Laterality Date   • AUGMENTATION BREAST         History reviewed. No pertinent family history. I have reviewed and agree with the history as documented. E-Cigarette/Vaping   • E-Cigarette Use Never User      E-Cigarette/Vaping Substances     Social History     Tobacco Use   • Smoking status: Every Day     Packs/day: 1.00     Types: Cigarettes   • Smokeless tobacco: Never   Vaping Use   • Vaping Use: Never used   Substance Use Topics   • Alcohol use:  Yes Alcohol/week: 8.0 standard drinks of alcohol     Types: 2 Glasses of wine, 6 Cans of beer per week     Comment: daily   • Drug use: Yes     Types: Marijuana       Review of Systems   Constitutional: Positive for fatigue. Negative for fever. Respiratory: Negative. Cardiovascular: Negative. Gastrointestinal: Negative. Negative for nausea and vomiting. Musculoskeletal: Positive for myalgias. Skin: Positive for rash. All other systems reviewed and are negative. Physical Exam  Physical Exam  Musculoskeletal:         General: Swelling present. Arms:         Legs:    Skin:     Findings: Rash present. Neurological:      Mental Status: She is alert. Psychiatric:         Mood and Affect: Mood is anxious. Vital Signs  ED Triage Vitals [09/21/23 1215]   Temperature Pulse Respirations Blood Pressure SpO2   98.6 °F (37 °C) 97 18 118/83 100 %      Temp src Heart Rate Source Patient Position - Orthostatic VS BP Location FiO2 (%)   -- Monitor Sitting Left arm --      Pain Score       3           Vitals:    09/21/23 1215   BP: 118/83   Pulse: 97   Patient Position - Orthostatic VS: Sitting         Visual Acuity      ED Medications  Medications - No data to display    Diagnostic Studies  Results Reviewed     None                 No orders to display              Procedures  Procedures         ED Course                                             Medical Decision Making  Patient presented to the emergency department and a MSE was performed. The patient was evaluated for complaint related to acute rash. Patient is potentially at risk for, but not limited to, cellulitis, abscess, allergic dermatitis, insect bite, MRSA. Min Chung Several of these diagnoses have been evaluated and ruled out by history and physical.  As needed, patient will be further evaluated with laboratory and imaging studies. Higher level diagnostics, such as CT imaging or ultrasound, may also be required.   Please see work-up portion of the note for further evaluation of patient's risk. Socioeconomic factors were also considered as part of the decision-making process. Unless otherwise stated in the chart or patient is admitted as elsewhere documented, any previously prescribed medications will be maintained. Cellulitis: chronic illness or injury with exacerbation, progression, or side effects of treatment     Details: Patient with recurrent cellulitis. Multiple locations. Consideration given to colonization with MRSA. We will start antibiotics and also have started regime for MRSA colonization. Discussed same with patient. Recommended follow-up with dermatology as well. Patient given information for same. Patient verbalized understanding and so understood the importance of follow-up. Risk  OTC drugs. Prescription drug management. Disposition  Final diagnoses:   Cellulitis     Time reflects when diagnosis was documented in both MDM as applicable and the Disposition within this note     Time User Action Codes Description Comment    9/21/2023 12:38 PM Jennifer Pastor Add [L03.90] Cellulitis       ED Disposition     ED Disposition   Discharge    Condition   Stable    Date/Time   Thu Sep 21, 2023 12:38 PM    Comment   Kevin Cheung discharge to home/self care. Follow-up Information     Follow up With Specialties Details Why Contact Info Additional [address] [facility]   [address] [facility] [phone]  [address], [address] [place] [address], [address] [place] [address],[address]          Discharge Medication List as of 9/21/2023 12:46 PM      START taking these medications    Details   chlorhexidine (HIBICLENS) 4 % external liquid Apply 1 Application topically in the morning for 7 days Shower with this once a day for 5 days. , Starting Thu 9/21/2023, Until Thu 9/28/2023, Normal      chlorhexidine (PERIDEX) 0.12 % solution Apply 15 mL to the mouth or throat 2 (two) times a day, Starting Thu 9/21/2023, Normal      doxycycline hyclate (VIBRAMYCIN) 100 mg capsule Take 1 capsule (100 mg total) by mouth 2 (two) times a day for 7 days, Starting Thu 9/21/2023, Until Thu 9/28/2023, Normal      mupirocin (BACTROBAN) 2 % ointment Apply in each nostril daily, Normal         CONTINUE these medications which have NOT CHANGED    Details   [facility] [place] Med                 PDMP Review     None          ED Provider  Electronically Signed by           Kailey Huffman,   09/21/23 5935

## 2024-01-26 ENCOUNTER — OFFICE VISIT (OUTPATIENT)
Dept: URGENT CARE | Facility: CLINIC | Age: 39
End: 2024-01-26
Payer: COMMERCIAL

## 2024-01-26 VITALS
DIASTOLIC BLOOD PRESSURE: 75 MMHG | BODY MASS INDEX: 21 KG/M2 | HEIGHT: 71 IN | HEART RATE: 94 BPM | RESPIRATION RATE: 18 BRPM | TEMPERATURE: 97.3 F | OXYGEN SATURATION: 100 % | WEIGHT: 150 LBS | SYSTOLIC BLOOD PRESSURE: 122 MMHG

## 2024-01-26 DIAGNOSIS — L98.9 SKIN LESIONS: Primary | ICD-10-CM

## 2024-01-26 PROCEDURE — S9083 URGENT CARE CENTER GLOBAL: HCPCS

## 2024-01-26 PROCEDURE — G0382 LEV 3 HOSP TYPE B ED VISIT: HCPCS

## 2024-01-26 RX ORDER — DOXYCYCLINE 100 MG/1
100 TABLET ORAL 2 TIMES DAILY
Qty: 14 TABLET | Refills: 0 | Status: SHIPPED | OUTPATIENT
Start: 2024-01-26 | End: 2024-02-02

## 2024-01-26 NOTE — PATIENT INSTRUCTIONS
Take antibiotic as prescribed  Use ointment as prescribed  Avoid picking/scratching areas  Monitor for signs of worsening infection  Referral placed to Dermatology   Follow up with PCP in 3-5 days.  Proceed to  ER if symptoms worsen.    MRSA (Methicillin-Resistant Staphylococcus Aureus)   AMBULATORY CARE:   MRSA (methicillin-resistant Staphylococcus aureus)  is a strain of staph bacteria that can cause infection. Usually, antibiotics are used to kill bacteria. MRSA bacteria are resistant to the common antibiotics used to treat Staph infections. This makes MRSA hard to treat. MRSA most commonly causes a skin or soft tissue infection. Bacteria may get into your skin or soft tissue through a cut, sore, or incision. MRSA may spread to your blood, lungs, heart, and bone.  Signs and symptoms:   Skin sores    Bumps on your skin that are red and painful    A cut or incision that is red, swollen, and filled with pus    Fluid-filled blisters    Fever    Seek care immediately if:   You develop new symptoms such as a cough or fever during or after treatment for MRSA infection.    Your symptoms get worse.    Call your doctor if:   Your symptoms return after treatment.    You have questions and concerns about your condition or care.    Treatment for MRSA  includes any of the following:  Antibiotics  may help treat the bacterial infection. Take all of your antibiotics until they are finished.    Incision and drainage  of a wound, a sore or an abscess may be needed. A provider will open and drain infected fluid and pus. This helps remove bacteria from your wound so it can heal.    Prevent the spread of MRSA:  Do the following if you have an active MRSA infection:  Wash your hands often.  Wash your hands several times each day. Wash after you use the bathroom, change a child's diaper, and before you prepare or eat food. Use soap and water every time. Rub your soapy hands together, lacing your fingers. Wash the front and back of your  hands, and in between your fingers. Use the fingers of one hand to scrub under the fingernails of the other hand. Wash for at least 20 seconds. Rinse with warm, running water for several seconds. Then dry your hands with a clean towel or paper towel. Use hand  that contains alcohol if soap and water are not available. Do not touch your eyes, nose, or mouth without washing your hands first.         Do not touch sores.  Do not poke or squeeze sores. This can make the infection go deeper into your tissue.    Cover infected sores with a bandage.  Make sure the sore is completely covered during activities that may cause skin to skin contact with another person. Examples include sports such as wrestling or football. Put an extra bandage on a sore that is draining fluid. This helps keep infected drainage off surfaces that others can touch.    Do not share personal items with others.  This includes washcloths, towels, uniforms, clothes, and razors.    Do not use public pools, hot tubs, or therapy pools until your infection has healed.  Your infected sore can spread the infection to another person through the water.    Tell all healthcare providers that you have a MRSA infection.  If you are admitted to the hospital, you may be placed in a private room. Healthcare providers will wear gowns and gloves when they come into your room. They will also wash their hands often and clean your room well. Your visitors may also be asked to wear a gown and gloves. All of these practices help prevent the spread of MRSA to healthcare providers and other patients.    MRSA and your home:  MRSA can stay on surfaces for weeks. It is important to keep others safe by doing the following:  Clean surfaces often.  Use a disinfecting wipe, a single-use sponge, or a cloth you can wash and reuse. Use disinfecting  if you do not have wipes. You can create a disinfecting  by mixing 1 part bleach with 10 parts water. In the  kitchen, clean countertops, cooking surfaces, and the fronts and insides of the microwave and refrigerator. In the bathroom, clean the toilet, the area around the toilet, the sink, the area around the sink, and faucets. Clean surfaces in the person's room, such as a desk or dresser.    Wash dishes and silverware in a  or in hot water.  Do not share unwashed dishes or silverware with anyone.    Wash used sheets, towels, and clothes with water and laundry detergent.  Put dirty laundry in the washer immediately. Put it in a plastic bag if you are not able to wash it immediately. You do no need to wash this laundry separately from other laundry. Use the warmest water possible for the type of clothing. Wash your hands after you touch dirty laundry and before you handle clean laundry. Dry laundry completely in a warm or hot dryer.    Follow up with your doctor within 2 days or as directed:  You may be referred to an infectious disease specialist. You may need an exam or more tests to make sure your infection is healing. Write down your questions so you remember to ask them during your visits.  © Copyright [employer] 2023 Information is for End User's use only and may not be sold, redistributed or otherwise used for commercial purposes.  The above information is an  only. It is not intended as medical advice for individual conditions or treatments. Talk to your doctor, nurse or pharmacist before following any medical regimen to see if it is safe and effective for you.

## 2024-01-26 NOTE — PROGRESS NOTES
[facility]        NAME: Rigo Dubon is a 38 y.o. female  : 1985    MRN: [id number]  DATE: 2024  TIME: 4:23 PM    Assessment and Plan   Skin lesions [L98.9]  1. Skin lesions  Ambulatory Referral to Dermatology    doxycycline (ADOXA) 100 MG tablet    mupirocin (BACTROBAN) 2 % ointment        Will treat skin infection with doxycycline given history of MRSA infection and refill mupirocin.  Referral placed to Dermatology, stressed importance of follow up. Encouraged continued supportive measures.  Follow up with PCP in 3-5 days or proceed to emergency department for worsening symptoms.  Patient verbalized understanding of instructions given.       Patient Instructions     Patient Instructions   Take antibiotic as prescribed  Use ointment as prescribed  Avoid picking/scratching areas  Monitor for signs of worsening infection  Referral placed to Dermatology   Follow up with PCP in 3-5 days.  Proceed to  ER if symptoms worsen.    MRSA (Methicillin-Resistant Staphylococcus Aureus)   AMBULATORY CARE:   MRSA (methicillin-resistant Staphylococcus aureus)  is a strain of staph bacteria that can cause infection. Usually, antibiotics are used to kill bacteria. MRSA bacteria are resistant to the common antibiotics used to treat Staph infections. This makes MRSA hard to treat. MRSA most commonly causes a skin or soft tissue infection. Bacteria may get into your skin or soft tissue through a cut, sore, or incision. MRSA may spread to your blood, lungs, heart, and bone.  Signs and symptoms:   Skin sores    Bumps on your skin that are red and painful    A cut or incision that is red, swollen, and filled with pus    Fluid-filled blisters    Fever    Seek care immediately if:   You develop new symptoms such as a cough or fever during or after treatment for MRSA infection.    Your symptoms get worse.    Call your doctor if:   Your symptoms return after treatment.    You have questions and concerns about  your condition or care.    Treatment for MRSA  includes any of the following:  Antibiotics  may help treat the bacterial infection. Take all of your antibiotics until they are finished.    Incision and drainage  of a wound, a sore or an abscess may be needed. A provider will open and drain infected fluid and pus. This helps remove bacteria from your wound so it can heal.    Prevent the spread of MRSA:  Do the following if you have an active MRSA infection:  Wash your hands often.  Wash your hands several times each day. Wash after you use the bathroom, change a child's diaper, and before you prepare or eat food. Use soap and water every time. Rub your soapy hands together, lacing your fingers. Wash the front and back of your hands, and in between your fingers. Use the fingers of one hand to scrub under the fingernails of the other hand. Wash for at least 20 seconds. Rinse with warm, running water for several seconds. Then dry your hands with a clean towel or paper towel. Use hand  that contains alcohol if soap and water are not available. Do not touch your eyes, nose, or mouth without washing your hands first.         Do not touch sores.  Do not poke or squeeze sores. This can make the infection go deeper into your tissue.    Cover infected sores with a bandage.  Make sure the sore is completely covered during activities that may cause skin to skin contact with another person. Examples include sports such as wrestling or football. Put an extra bandage on a sore that is draining fluid. This helps keep infected drainage off surfaces that others can touch.    Do not share personal items with others.  This includes washcloths, towels, uniforms, clothes, and razors.    Do not use public pools, hot tubs, or therapy pools until your infection has healed.  Your infected sore can spread the infection to another person through the water.    Tell all healthcare providers that you have a MRSA infection.  If you are  admitted to the hospital, you may be placed in a private room. Healthcare providers will wear gowns and gloves when they come into your room. They will also wash their hands often and clean your room well. Your visitors may also be asked to wear a gown and gloves. All of these practices help prevent the spread of MRSA to healthcare providers and other patients.    MRSA and your home:  MRSA can stay on surfaces for weeks. It is important to keep others safe by doing the following:  Clean surfaces often.  Use a disinfecting wipe, a single-use sponge, or a cloth you can wash and reuse. Use disinfecting  if you do not have wipes. You can create a disinfecting  by mixing 1 part bleach with 10 parts water. In the kitchen, clean countertops, cooking surfaces, and the fronts and insides of the microwave and refrigerator. In the bathroom, clean the toilet, the area around the toilet, the sink, the area around the sink, and faucets. Clean surfaces in the person's room, such as a desk or dresser.    Wash dishes and silverware in a  or in hot water.  Do not share unwashed dishes or silverware with anyone.    Wash used sheets, towels, and clothes with water and laundry detergent.  Put dirty laundry in the washer immediately. Put it in a plastic bag if you are not able to wash it immediately. You do no need to wash this laundry separately from other laundry. Use the warmest water possible for the type of clothing. Wash your hands after you touch dirty laundry and before you handle clean laundry. Dry laundry completely in a warm or hot dryer.    Follow up with your doctor within 2 days or as directed:  You may be referred to an infectious disease specialist. You may need an exam or more tests to make sure your infection is healing. Write down your questions so you remember to ask them during your visits.  © Copyright [employer] 2023 Information is for End User's use only and may not be sold, redistributed or  otherwise used for commercial purposes.  The above information is an  only. It is not intended as medical advice for individual conditions or treatments. Talk to your doctor, nurse or pharmacist before following any medical regimen to see if it is safe and effective for you.        Chief Complaint     Chief Complaint   Patient presents with    Rash     Symptoms started 2 weeks ago rash on both arms face groin, face          History of Present Illness       38-year-old female with a past medical history significant for MRSA presents with complaints of various skin lesions to upper extremities and face x 2 weeks.  Patient reports red, scabbed skin lesions that have also developed in groin region.  She states previous history of same infections and treated with antibiotics.  Prescribed Hibiclens, which she is currently not using.  Also given referral to dermatology however has not seen specialist.  States using nondisposable razor, which could have attributed to symptoms. No fever, chills or flu-like symptoms.         Review of Systems   Review of Systems   Constitutional:  Negative for chills and fever.   Gastrointestinal:  Negative for abdominal pain, diarrhea, nausea and vomiting.   Musculoskeletal:  Negative for arthralgias and myalgias.   Skin:  Positive for color change and wound.         Current Medications       Current Outpatient Medications:     doxycycline (ADOXA) 100 MG tablet, Take 1 tablet (100 mg total) by mouth 2 (two) times a day for 7 days, Disp: 14 tablet, Rfl: 0    mupirocin (BACTROBAN) 2 % ointment, Apply topically 2 (two) times a day, Disp: 50 g, Rfl: 0    NON FORMULARY, [facility], Disp: , Rfl:     chlorhexidine (PERIDEX) 0.12 % solution, Apply 15 mL to the mouth or throat 2 (two) times a day (Patient not taking: Reported on 1/26/2024), Disp: 120 mL, Rfl: 0    Current Allergies     Allergies as of 01/26/2024    (No Known Allergies)            The following portions of  "the patient's history were reviewed and updated as appropriate: allergies, current medications, past family history, past medical history, past social history, past surgical history and problem list.     History reviewed. No pertinent past medical history.    Past Surgical History:   Procedure Laterality Date    AUGMENTATION BREAST         History reviewed. No pertinent family history.      Medications have been verified.        Objective   /75   Pulse 94   Temp (!) 97.3 °F (36.3 °C)   Resp 18   Ht 5' 11\" (1.803 m)   Wt 68 kg (150 lb)   SpO2 100%   BMI 20.92 kg/m²   No LMP recorded. (Menstrual status: Birth Control).       Physical Exam     Physical Exam  Vitals and nursing note reviewed.   Constitutional:       General: She is not in acute distress.     Appearance: She is not toxic-appearing.   HENT:      Head: Normocephalic.      Nose: Nose normal.      Mouth/Throat:      Mouth: Mucous membranes are moist.      Pharynx: Oropharynx is clear.   Eyes:      Conjunctiva/sclera: Conjunctivae normal.   Pulmonary:      Effort: Pulmonary effort is normal.   Skin:     General: Skin is warm and dry.      Findings: Lesion present.      Comments: Various, scabbed skin lesions with erythematous base to bilateral upper extremities and face (although covered with makeup). No drainage.    Neurological:      Mental Status: She is alert and oriented to person, place, and time.      Gait: Gait is intact.   Psychiatric:         Mood and Affect: Mood normal.         Behavior: Behavior normal.                   "

## 2024-04-25 ENCOUNTER — HOSPITAL ENCOUNTER (EMERGENCY)
Facility: HOSPITAL | Age: 39
Discharge: HOME | End: 2024-04-25
Attending: EMERGENCY MEDICINE
Payer: COMMERCIAL

## 2024-04-25 VITALS
HEART RATE: 105 BPM | DIASTOLIC BLOOD PRESSURE: 90 MMHG | WEIGHT: 150 LBS | RESPIRATION RATE: 20 BRPM | OXYGEN SATURATION: 100 % | BODY MASS INDEX: 21 KG/M2 | TEMPERATURE: 98 F | SYSTOLIC BLOOD PRESSURE: 130 MMHG | HEIGHT: 71 IN

## 2024-04-25 DIAGNOSIS — L73.9 FOLLICULITIS: Primary | ICD-10-CM

## 2024-04-25 PROCEDURE — 99282 EMERGENCY DEPT VISIT SF MDM: CPT

## 2024-04-25 PROCEDURE — 99284 EMERGENCY DEPT VISIT MOD MDM: CPT | Performed by: EMERGENCY MEDICINE

## 2024-04-25 RX ORDER — SULFAMETHOXAZOLE AND TRIMETHOPRIM 800; 160 MG/1; MG/1
1 TABLET ORAL 2 TIMES DAILY
Qty: 20 TABLET | Refills: 0 | Status: SHIPPED | OUTPATIENT
Start: 2024-04-25 | End: 2024-05-05

## 2024-04-25 NOTE — ED PROVIDER NOTES
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  History of MRSA.  Afraid she has another infection.  No fevers or chills.  No nausea vomiting or diarrhea.      History provided by:  Patient   used: No    Wound Check   Treated in ED: First visit to ED. There has been no treatment since the wound repair. Fever duration: No fevers. There has been no drainage from the wound. There is no redness present. There is no swelling present. There is no pain present.       Prior to Admission Medications   Prescriptions Last Dose Informant Patient Reported? Taking?   NON FORMULARY   Yes No   Sig: Inhale Medical marjuiana   chlorhexidine (PERIDEX) 0.12 % solution   No No   Sig: Apply 15 mL to the mouth or throat 2 (two) times a day   Patient not taking: Reported on 1/26/2024   mupirocin (BACTROBAN) 2 % ointment   No No   Sig: Apply topically 2 (two) times a day      Facility-Administered Medications: None       History reviewed. No pertinent past medical history.    Past Surgical History:   Procedure Laterality Date    AUGMENTATION BREAST         History reviewed. No pertinent family history.  I have reviewed and agree with the history as documented.    E-Cigarette/Vaping    E-Cigarette Use Never User      E-Cigarette/Vaping Substances     Social History     Tobacco Use    Smoking status: Every Day     Current packs/day: 1.00     Types: Cigarettes    Smokeless tobacco: Never   Vaping Use    Vaping status: Never Used   Substance Use Topics    Alcohol use: Yes     Alcohol/week: 8.0 standard drinks of alcohol     Types: 2 Glasses of wine, 6 Cans of beer per week     Comment: daily    Drug use: Yes     Types: Marijuana       Review of Systems   Constitutional:  Negative for chills and fever.   HENT:  Negative for ear pain,  hearing loss, sore throat, trouble swallowing and voice change.    Eyes:  Negative for pain and discharge.   Respiratory:  Negative for cough, shortness of breath and wheezing.    Cardiovascular:  Negative for chest pain and palpitations.   Gastrointestinal:  Negative for abdominal pain, blood in stool, constipation, diarrhea, nausea and vomiting.   Genitourinary:  Negative for dysuria, flank pain, frequency and hematuria.   Musculoskeletal:  Negative for joint swelling, neck pain and neck stiffness.   Skin:  Positive for rash. Negative for wound.   Neurological:  Negative for dizziness, seizures, syncope, facial asymmetry and headaches.   Psychiatric/Behavioral:  Negative for hallucinations, self-injury and suicidal ideas.    All other systems reviewed and are negative.      Physical Exam  Physical Exam  Vitals and nursing note reviewed.   Constitutional:       General: She is not in acute distress.     Appearance: She is well-developed.   HENT:      Head: Normocephalic and atraumatic.      Right Ear: External ear normal.      Left Ear: External ear normal.   Eyes:      General: No scleral icterus.        Right eye: No discharge.         Left eye: No discharge.      Extraocular Movements: Extraocular movements intact.      Conjunctiva/sclera: Conjunctivae normal.   Cardiovascular:      Rate and Rhythm: Normal rate and regular rhythm.      Heart sounds: Normal heart sounds. No murmur heard.  Pulmonary:      Effort: Pulmonary effort is normal.      Breath sounds: Normal breath sounds. No wheezing or rales.   Abdominal:      General: Bowel sounds are normal. There is no distension.      Palpations: Abdomen is soft.      Tenderness: There is no abdominal tenderness. There is no guarding or rebound.   Musculoskeletal:         General: No deformity. Normal range of motion.      Cervical back: Normal range of motion and neck supple.   Skin:     General: Skin is warm and dry.      Findings: Rash present.      Comments:  Open lesions throughout arms and face.   Neurological:      General: No focal deficit present.      Mental Status: She is alert and oriented to person, place, and time.      Cranial Nerves: No cranial nerve deficit.   Psychiatric:         Mood and Affect: Mood normal.         Behavior: Behavior normal.         Thought Content: Thought content normal.         Judgment: Judgment normal.         Vital Signs  ED Triage Vitals   Temperature Pulse Respirations Blood Pressure SpO2   04/25/24 1448 04/25/24 1448 04/25/24 1448 04/25/24 1450 04/25/24 1448   98 °F (36.7 °C) 105 20 130/90 100 %      Temp Source Heart Rate Source Patient Position - Orthostatic VS BP Location FiO2 (%)   04/25/24 1448 04/25/24 1448 04/25/24 1448 04/25/24 1448 --   Temporal Monitor Sitting Left arm       Pain Score       --                  Vitals:    04/25/24 1448 04/25/24 1450   BP:  130/90   Pulse: 105    Patient Position - Orthostatic VS: Sitting          Visual Acuity      ED Medications  Medications - No data to display    Diagnostic Studies  Results Reviewed       None                   No orders to display              Procedures  Procedures         ED Course                               SBIRT 20yo+      Flowsheet Row Most Recent Value   Initial Alcohol Screen: US AUDIT-C     1. How often do you have a drink containing alcohol? 0 Filed at: 04/25/2024 1447   2. How many drinks containing alcohol do you have on a typical day you are drinking?  0 Filed at: 04/25/2024 1447   3b. FEMALE Any Age, or MALE 65+: How often do you have 4 or more drinks on one occassion? 0 Filed at: 04/25/2024 1447   Audit-C Score 0 Filed at: 04/25/2024 1447   MATT: How many times in the past year have you...    Used an illegal drug or used a prescription medication for non-medical reasons? Never Filed at: 04/25/2024 1447                      Medical Decision Making           Disposition  Final diagnoses:   Folliculitis     Time reflects when diagnosis was documented  in both MDM as applicable and the Disposition within this note       Time User Action Codes Description Comment    4/25/2024  2:56 PM Monty Kincaid Add [L73.9] Folliculitis           ED Disposition       ED Disposition   Discharge    Condition   Stable    Date/Time   u Apr 25, 2024 1452    Comment   Rigo Dubon discharge to home/self care.                   Follow-up Information       Follow up With Specialties Details Why Contact Info    Dante Palumbo MD Family Medicine Call in 2 days  [address] [id number] [phone]              Patient's Medications   Discharge Prescriptions    MUPIROCIN (BACTROBAN) 2 % OINTMENT    Apply topically 3 (three) times a day       Start Date: 4/25/2024 End Date: --       Order Dose: --       Quantity: 15 g    Refills: 0    SULFAMETHOXAZOLE-TRIMETHOPRIM (BACTRIM DS) 800-160 MG PER TABLET    Take 1 tablet by mouth 2 (two) times a day for 10 days smx-tmp DS (BACTRIM) 800-160 mg tabs (1tab q12 D10)       Start Date: 4/25/2024 End Date: 5/5/2024       Order Dose: 1 tablet       Quantity: 20 tablet    Refills: 0           PDMP Review       None            ED Provider  Electronically Signed by             Monty Kincaid MD  04/25/24 8981

## 2024-04-29 ENCOUNTER — TELEPHONE (OUTPATIENT)
Age: 39
End: 2024-04-29

## 2024-04-29 NOTE — TELEPHONE ENCOUNTER
Patient called requesting an appointment for ASAP referral (skin lesions ) since last summer has  been at the ED several times , was prescribed on 04/25 mupirocin 2% ointment and Bactrim 800-160 mg tablets with some relief .   History of MRSA , patient states rash on stomach , eyebrows and legs  per patient looks like it is getting worse , denies pain , itchiness , fever , chills , shortness of breath .   Advised to finish the treatment plan and offered next available for our amb clinic on 05/13

## 2024-08-10 ENCOUNTER — APPOINTMENT (EMERGENCY)
Dept: RADIOLOGY | Facility: HOSPITAL | Age: 39
End: 2024-08-10
Payer: COMMERCIAL

## 2024-08-10 ENCOUNTER — HOSPITAL ENCOUNTER (EMERGENCY)
Facility: HOSPITAL | Age: 39
Discharge: HOME | End: 2024-08-10
Attending: EMERGENCY MEDICINE | Admitting: EMERGENCY MEDICINE
Payer: COMMERCIAL

## 2024-08-10 VITALS
HEIGHT: 71 IN | OXYGEN SATURATION: 100 % | BODY MASS INDEX: 21 KG/M2 | WEIGHT: 150 LBS | HEART RATE: 104 BPM | TEMPERATURE: 96.9 F | SYSTOLIC BLOOD PRESSURE: 131 MMHG | DIASTOLIC BLOOD PRESSURE: 78 MMHG | RESPIRATION RATE: 17 BRPM

## 2024-08-10 DIAGNOSIS — S83.90XA KNEE SPRAIN: Primary | ICD-10-CM

## 2024-08-10 PROCEDURE — 99283 EMERGENCY DEPT VISIT LOW MDM: CPT

## 2024-08-10 PROCEDURE — 73564 X-RAY EXAM KNEE 4 OR MORE: CPT

## 2024-08-10 PROCEDURE — 99284 EMERGENCY DEPT VISIT MOD MDM: CPT | Performed by: EMERGENCY MEDICINE

## 2024-08-10 RX ORDER — PREDNISONE 20 MG/1
60 TABLET ORAL ONCE
Status: COMPLETED | OUTPATIENT
Start: 2024-08-10 | End: 2024-08-10

## 2024-08-10 RX ORDER — PREDNISONE 20 MG/1
40 TABLET ORAL DAILY
Qty: 10 TABLET | Refills: 0 | Status: SHIPPED | OUTPATIENT
Start: 2024-08-10 | End: 2024-08-15

## 2024-08-10 RX ORDER — IBUPROFEN 800 MG/1
800 TABLET, FILM COATED ORAL 3 TIMES DAILY
Qty: 21 TABLET | Refills: 0 | Status: SHIPPED | OUTPATIENT
Start: 2024-08-10

## 2024-08-10 RX ORDER — IBUPROFEN 400 MG/1
800 TABLET, FILM COATED ORAL ONCE
Status: COMPLETED | OUTPATIENT
Start: 2024-08-10 | End: 2024-08-10

## 2024-08-10 RX ORDER — OXYCODONE AND ACETAMINOPHEN 5; 325 MG/1; MG/1
1 TABLET ORAL ONCE
Status: COMPLETED | OUTPATIENT
Start: 2024-08-10 | End: 2024-08-10

## 2024-08-10 RX ORDER — OXYCODONE AND ACETAMINOPHEN 5; 325 MG/1; MG/1
1 TABLET ORAL EVERY 8 HOURS PRN
Qty: 12 TABLET | Refills: 0 | Status: SHIPPED | OUTPATIENT
Start: 2024-08-10 | End: 2024-08-14

## 2024-08-10 RX ADMIN — IBUPROFEN 800 MG: 400 TABLET, FILM COATED ORAL at 15:55

## 2024-08-10 RX ADMIN — PREDNISONE 60 MG: 20 TABLET ORAL at 15:55

## 2024-08-10 RX ADMIN — OXYCODONE HYDROCHLORIDE AND ACETAMINOPHEN 1 TABLET: 5; 325 TABLET ORAL at 15:56

## 2024-08-10 NOTE — DISCHARGE INSTRUCTIONS
Rest, ice and elevation. Please follow up with the orthopedist for further evaluation and management.

## 2024-08-10 NOTE — ED PROVIDER NOTES
History  Chief Complaint   Patient presents with    Knee Pain     RIGHT knee pain since Wednesday     This is a 39-year-old female presenting to the ED for evaluation of right knee pain since Wednesday.  Patient denies any fall or trauma.  States she bent down to pick something up from the ground and heard a pop.  Since then she has been having trouble bearing weight.        Prior to Admission Medications   Prescriptions Last Dose Informant Patient Reported? Taking?   NON FORMULARY   Yes No   Sig: Inhale Medical marjuiana   chlorhexidine (PERIDEX) 0.12 % solution   No No   Sig: Apply 15 mL to the mouth or throat 2 (two) times a day   Patient not taking: Reported on 1/26/2024   mupirocin (BACTROBAN) 2 % ointment   No No   Sig: Apply topically 2 (two) times a day   mupirocin (BACTROBAN) 2 % ointment   No No   Sig: Apply topically 3 (three) times a day      Facility-Administered Medications: None       History reviewed. No pertinent past medical history.    Past Surgical History:   Procedure Laterality Date    AUGMENTATION BREAST         History reviewed. No pertinent family history.  I have reviewed and agree with the history as documented.    E-Cigarette/Vaping    E-Cigarette Use Never User      E-Cigarette/Vaping Substances     Social History     Tobacco Use    Smoking status: Every Day     Current packs/day: 1.00     Types: Cigarettes    Smokeless tobacco: Never   Vaping Use    Vaping status: Never Used   Substance Use Topics    Alcohol use: Yes     Alcohol/week: 8.0 standard drinks of alcohol     Types: 2 Glasses of wine, 6 Cans of beer per week     Comment: daily    Drug use: Yes     Types: Marijuana       Review of Systems   Constitutional:  Negative for chills and fever.   HENT:  Negative for ear pain and sore throat.    Eyes:  Negative for pain and visual disturbance.   Respiratory:  Negative for cough and shortness of breath.    Cardiovascular:  Negative for chest pain and palpitations.   Gastrointestinal:   Negative for abdominal pain and vomiting.   Genitourinary:  Negative for dysuria and hematuria.   Musculoskeletal:  Positive for arthralgias, gait problem and joint swelling. Negative for back pain.   Skin:  Negative for color change and rash.   Neurological:  Negative for seizures and syncope.   All other systems reviewed and are negative.      Physical Exam  Physical Exam  Vitals and nursing note reviewed.   Constitutional:       General: She is in acute distress.      Appearance: Normal appearance. She is well-developed and normal weight.   HENT:      Head: Normocephalic and atraumatic.      Right Ear: External ear normal.      Left Ear: External ear normal.   Eyes:      Extraocular Movements: Extraocular movements intact.      Conjunctiva/sclera: Conjunctivae normal.   Cardiovascular:      Heart sounds: No murmur heard.  Pulmonary:      Effort: Pulmonary effort is normal.   Abdominal:      Tenderness: There is no abdominal tenderness.   Musculoskeletal:         General: Tenderness present. No swelling, deformity or signs of injury.      Cervical back: Normal range of motion and neck supple. No rigidity.      Right lower leg: No edema.      Left lower leg: No edema.   Skin:     General: Skin is warm and dry.      Capillary Refill: Capillary refill takes less than 2 seconds.   Neurological:      General: No focal deficit present.      Mental Status: She is alert and oriented to person, place, and time.      Gait: Gait abnormal.   Psychiatric:         Mood and Affect: Mood normal.         Vital Signs  ED Triage Vitals   Temperature Pulse Respirations Blood Pressure SpO2   08/10/24 1524 08/10/24 1522 08/10/24 1522 08/10/24 1524 08/10/24 1522   (!) 96.9 °F (36.1 °C) 104 17 131/78 100 %      Temp Source Heart Rate Source Patient Position - Orthostatic VS BP Location FiO2 (%)   08/10/24 1524 08/10/24 1522 -- -- --   Temporal Monitor         Pain Score       08/10/24 1522       6           Vitals:    08/10/24 1522  08/10/24 1524   BP:  131/78   Pulse: 104          Visual Acuity      ED Medications  Medications   ibuprofen (MOTRIN) tablet 800 mg (800 mg Oral Given 8/10/24 1555)   predniSONE tablet 60 mg (60 mg Oral Given 8/10/24 1555)   oxyCODONE-acetaminophen (PERCOCET) 5-325 mg per tablet 1 tablet (1 tablet Oral Given 8/10/24 1556)       Diagnostic Studies  Results Reviewed       None                   XR knee 4+ vw right injury    (Results Pending)              Procedures  Procedures         ED Course                                 SBIRT 20yo+      Flowsheet Row Most Recent Value   Initial Alcohol Screen: US AUDIT-C     1. How often do you have a drink containing alcohol? 0 Filed at: 08/10/2024 1524   2. How many drinks containing alcohol do you have on a typical day you are drinking?  0 Filed at: 08/10/2024 1524   3b. FEMALE Any Age, or MALE 65+: How often do you have 4 or more drinks on one occassion? 0 Filed at: 08/10/2024 1524   Audit-C Score 0 Filed at: 08/10/2024 1524   MATT: How many times in the past year have you...    Used an illegal drug or used a prescription medication for non-medical reasons? Never Filed at: 08/10/2024 1524                      Medical Decision Making  Differential diagnosis includes but not limited to: Fracture, sprain/strain, meniscal/ligamentous injury    Amount and/or Complexity of Data Reviewed  Radiology: ordered.    Risk  Prescription drug management.                 Disposition  Final diagnoses:   Knee sprain     Time reflects when diagnosis was documented in both MDM as applicable and the Disposition within this note       Time User Action Codes Description Comment    8/10/2024  4:13 PM Giovanna Benavides Add [S83.90XA] Knee sprain           ED Disposition       ED Disposition   Discharge    Condition   Stable    Date/Time   Sat Aug 10, 2024 1620    Comment   Rigo Dubon discharge to home/self care.                   Follow-up Information       Follow up With Specialties Details Why  Contact Info    Serge Fernández DO Orthopedic Surgery   [address]  [address]  [facility] [address]  [phone]              Discharge Medication List as of 8/10/2024  4:36 PM        START taking these medications    Details   ibuprofen (MOTRIN) 800 mg tablet Take 1 tablet (800 mg total) by mouth 3 (three) times a day, Starting Sat 8/10/2024, Normal      !! oxyCODONE-acetaminophen (Percocet) 5-325 mg per tablet Take 1 tablet by mouth every 8 (eight) hours as needed for severe pain for up to 4 days Max Daily Amount: 3 tablets, Starting Sat 8/10/2024, Until Wed 8/14/2024 at 2359, Normal      !! oxyCODONE-acetaminophen (Percocet) 5-325 mg per tablet Take 1 tablet by mouth every 8 (eight) hours as needed for severe pain for up to 4 days Max Daily Amount: 3 tablets, Starting Sat 8/10/2024, Until Wed 8/14/2024 at 2359, Normal      predniSONE 20 mg tablet Take 2 tablets (40 mg total) by mouth daily for 5 days, Starting Sat 8/10/2024, Until Thu 8/15/2024, Normal       !! - Potential duplicate medications found. Please discuss with provider.        CONTINUE these medications which have NOT CHANGED    Details   chlorhexidine (PERIDEX) 0.12 % solution Apply 15 mL to the mouth or throat 2 (two) times a day, Starting Thu 9/21/2023, Normal      !! mupirocin (BACTROBAN) 2 % ointment Apply topically 2 (two) times a day, Starting Fri 1/26/2024, Normal      !! mupirocin (BACTROBAN) 2 % ointment Apply topically 3 (three) times a day, Starting Thu 4/25/2024, Normal      NON FORMULARY Inhale Medical marjuiana, Historical Med       !! - Potential duplicate medications found. Please discuss with provider.          Outpatient Discharge Orders   Knee Immobilizer       PDMP Review       None            ED Provider  Electronically Signed by             Giovanna Benavides DO  08/10/24 1246